# Patient Record
Sex: FEMALE | Race: WHITE | NOT HISPANIC OR LATINO | Employment: OTHER | URBAN - METROPOLITAN AREA
[De-identification: names, ages, dates, MRNs, and addresses within clinical notes are randomized per-mention and may not be internally consistent; named-entity substitution may affect disease eponyms.]

---

## 2023-07-16 ENCOUNTER — OFFICE VISIT (OUTPATIENT)
Dept: URGENT CARE | Facility: CLINIC | Age: 73
End: 2023-07-16
Payer: COMMERCIAL

## 2023-07-16 VITALS
DIASTOLIC BLOOD PRESSURE: 83 MMHG | TEMPERATURE: 97.1 F | SYSTOLIC BLOOD PRESSURE: 142 MMHG | RESPIRATION RATE: 18 BRPM | HEART RATE: 68 BPM | OXYGEN SATURATION: 98 %

## 2023-07-16 DIAGNOSIS — M17.12 PRIMARY OSTEOARTHRITIS OF LEFT KNEE: Primary | ICD-10-CM

## 2023-07-16 PROCEDURE — G0384 LEV 5 HOSP TYPE B ED VISIT: HCPCS | Performed by: PHYSICIAN ASSISTANT

## 2023-07-16 RX ORDER — OMEPRAZOLE 20 MG/1
CAPSULE, DELAYED RELEASE ORAL
COMMUNITY
Start: 2023-05-15

## 2023-07-16 RX ORDER — ROSUVASTATIN CALCIUM 5 MG/1
TABLET, COATED ORAL
COMMUNITY
Start: 2023-05-22

## 2023-07-16 NOTE — PROGRESS NOTES
[facility]        NAME: Alexandria Javier is a 71 y.o. female  : 1950    MRN: [id number]  DATE: 2023  TIME: 2:03 PM      Assessment and Plan     Primary osteoarthritis of left knee [M17.12]  1. Primary osteoarthritis of left knee  Ambulatory Referral to Orthopedic Surgery            Patient Instructions     Patient Instructions   Joint Pain    To help with pain   - Take ibuprofen or acetaminophen as directed on the packaging (it helps to start taking it around-the-clock at first to get ahead of the pain, then to take as needed)  - Ice and elevate the injured area 4 times daily for 20-30 minutes each time for the next 3-4 days, then convert to warm compresses in the same regimen   Other measures to take to help with healing    - Perform range of motion exercises 5-10 reps each at least 4 times per day so the joint does not get stiff    - Limit your physical activity with the affected joint. Doing too much too fast is the easiest way to continue to have pain  Follow up with Orthopedics as discussed       Follow up with PCP in 3-5 days. Go to ER if symptoms worsen. Chief Complaint     Chief Complaint   Patient presents with   • Knee Pain     Patient here with left knee pain for about a week or two now. Patient does not recall any injury to it. Patient states she has history of osteoarthritis which she usually gets injections for, but she has not had the injections within the last year. History of Present Illness     Patient presents with left knee pain. She states she has OA in that knee, but it has been getting worse over the last 1-2 weeks. She has been using CBD cream and motrin with relief. She was getting steroid shots in the knee about 1.5 years ago, but didn't feel like they were helping. Patient denies current injury. Knee Pain   Pertinent negatives include no numbness.        Review of Systems     Review of Systems   Constitutional: Negative for chills, fatigue and fever.   HENT: Negative for congestion, ear pain, postnasal drip, rhinorrhea, sinus pressure, sinus pain and sore throat. Eyes: Negative for pain and visual disturbance. Respiratory: Negative for cough, chest tightness and shortness of breath. Cardiovascular: Negative for chest pain and palpitations. Gastrointestinal: Negative for abdominal pain, diarrhea, nausea and vomiting. Genitourinary: Negative for dysuria and hematuria. Musculoskeletal: Positive for arthralgias. Negative for back pain and myalgias. Skin: Negative for rash. Neurological: Negative for dizziness, seizures, syncope, numbness and headaches. All other systems reviewed and are negative. Current Medications       Current Outpatient Medications:   •  omeprazole (PriLOSEC) 20 mg delayed release capsule, , Disp: , Rfl:   •  rosuvastatin (CRESTOR) 5 mg tablet, , Disp: , Rfl:     Current Allergies     Allergies as of 07/16/2023 - Reviewed 07/16/2023   Allergen Reaction Noted   • Codeine Rash 05/31/2022              The following portions of the patient's history were reviewed and updated as appropriate: allergies, current medications, past family history, past medical history, past social history, past surgical history, and problem list.     History reviewed. No pertinent past medical history. Past Surgical History:   Procedure Laterality Date   • KNEE SURGERY Left 2016       History reviewed. No pertinent family history. Medications have been verified. Objective     /83   Pulse 68   Temp (!) 97.1 °F (36.2 °C)   Resp 18   SpO2 98%   No LMP recorded. Physical Exam     Physical Exam  Vitals and nursing note reviewed. Constitutional:       Appearance: Normal appearance. She is normal weight. HENT:      Head: Normocephalic and atraumatic. Cardiovascular:      Rate and Rhythm: Normal rate and regular rhythm. Heart sounds: Normal heart sounds.    Pulmonary:      Effort: Pulmonary effort is normal.      Breath sounds: Normal breath sounds. Musculoskeletal:      Right knee: No swelling or erythema. Normal range of motion. No tenderness. No LCL laxity, MCL laxity, ACL laxity or PCL laxity. Normal meniscus. Skin:     General: Skin is warm and dry. Neurological:      General: No focal deficit present. Mental Status: She is alert and oriented to person, place, and time.    Psychiatric:         Mood and Affect: Mood normal.         Behavior: Behavior normal.

## 2023-07-16 NOTE — PATIENT INSTRUCTIONS
Joint Pain    To help with pain   - Take ibuprofen or acetaminophen as directed on the packaging (it helps to start taking it around-the-clock at first to get ahead of the pain, then to take as needed)  - Ice and elevate the injured area 4 times daily for 20-30 minutes each time for the next 3-4 days, then convert to warm compresses in the same regimen   Other measures to take to help with healing    - Perform range of motion exercises 5-10 reps each at least 4 times per day so the joint does not get stiff    - Limit your physical activity with the affected joint.  Doing too much too fast is the easiest way to continue to have pain  Follow up with Orthopedics as discussed

## 2023-07-31 ENCOUNTER — OFFICE VISIT (OUTPATIENT)
Dept: OBGYN CLINIC | Facility: CLINIC | Age: 73
End: 2023-07-31

## 2023-07-31 ENCOUNTER — APPOINTMENT (OUTPATIENT)
Dept: RADIOLOGY | Facility: CLINIC | Age: 73
End: 2023-07-31
Payer: COMMERCIAL

## 2023-07-31 VITALS
HEART RATE: 73 BPM | HEIGHT: 65 IN | BODY MASS INDEX: 22.36 KG/M2 | SYSTOLIC BLOOD PRESSURE: 112 MMHG | WEIGHT: 134.2 LBS | RESPIRATION RATE: 18 BRPM | DIASTOLIC BLOOD PRESSURE: 73 MMHG | OXYGEN SATURATION: 97 %

## 2023-07-31 DIAGNOSIS — M25.562 LEFT KNEE PAIN, UNSPECIFIED CHRONICITY: ICD-10-CM

## 2023-07-31 DIAGNOSIS — M25.561 RIGHT KNEE PAIN, UNSPECIFIED CHRONICITY: ICD-10-CM

## 2023-07-31 DIAGNOSIS — M17.0 PRIMARY OSTEOARTHRITIS OF BOTH KNEES: Primary | ICD-10-CM

## 2023-07-31 PROCEDURE — 73564 X-RAY EXAM KNEE 4 OR MORE: CPT

## 2023-07-31 RX ORDER — BUPIVACAINE HYDROCHLORIDE 2.5 MG/ML
4 INJECTION, SOLUTION INFILTRATION; PERINEURAL
Status: COMPLETED | OUTPATIENT
Start: 2023-07-31 | End: 2023-07-31

## 2023-07-31 RX ORDER — TRIAMCINOLONE ACETONIDE 40 MG/ML
40 INJECTION, SUSPENSION INTRA-ARTICULAR; INTRAMUSCULAR
Status: COMPLETED | OUTPATIENT
Start: 2023-07-31 | End: 2023-07-31

## 2023-07-31 RX ADMIN — TRIAMCINOLONE ACETONIDE 40 MG: 40 INJECTION, SUSPENSION INTRA-ARTICULAR; INTRAMUSCULAR at 11:00

## 2023-07-31 RX ADMIN — BUPIVACAINE HYDROCHLORIDE 4 ML: 2.5 INJECTION, SOLUTION INFILTRATION; PERINEURAL at 11:00

## 2023-07-31 NOTE — PROGRESS NOTES
Large joint arthrocentesis: bilateral knee  Universal Protocol:  Consent: Verbal consent obtained. Risks and benefits: risks, benefits and alternatives were discussed  Consent given by: patient    Supporting Documentation  Indications: pain   Procedure Details  Location: knee - bilateral knee  Needle size: 22 G  Ultrasound guidance: no  Approach: anterolateral    Medications (Right): 4 mL bupivacaine 0.25 %; 40 mg triamcinolone acetonide 40 mg/mLMedications (Left): 4 mL bupivacaine 0.25 %; 40 mg triamcinolone acetonide 40 mg/mL   Patient tolerance: patient tolerated the procedure well with no immediate complications    Risks and benefits of CSI were discussed with patient extensively. Risks were highlighted which included but were not limited to infection, pain, local site swelling, and chance that injection may not be effective. Patient was also counseled regarding glucose elevation days after receiving CSI and to be mindful of diet and check sugars daily. Patient agreeable to proceed with CSI after counseling.

## 2023-07-31 NOTE — PROGRESS NOTES
Accompanied by     Subjective:    Chief Complaint   Patient presents with   • Left Knee - Swelling, Pain   • Right Knee - Pain       Ying Jacobs is a 71 y.o. female complains of bilateral knee pain. Onset of the symptoms was several months ago. Mechanism of injury: none. Aggravating factors: going up and down stairs, walking  and weight bearing. Treatment to date: rest and HA injections, CSI which were effective in past. Symptoms have gradually worsened. The following portions of the patient's history were reviewed and updated as appropriate: allergies, current medications, past family history, past medical history, past social history, past surgical history and problem list.    Occupation:      Review of Systems   Constitutional: Negative for fever. HENT: Negative for dental problem and headaches. Eyes: Negative for vision loss. Respiratory: Negative for cough and shortness of breath. Cardiovascular: Negative for leg swelling and palpitations. Gastrointestinal: Negative for constipation and diarrhea. Genitourinary: Negative for bladder incontinence and difficulty urinating. Musculoskeletal: Negative for back pain and difficulty walking. Skin: Negative for rash and ulcer. Neurological: Negative for dizziness and headaches. Hem/Lymph/Immuno: Negative for blood clots. Does not bruise/bleed easily. Psychiatric/Behavioral: Negative for confusion. Objective:  /73   Pulse 73   Resp 18   Ht 5' 4.5" (1.638 m)   Wt 60.9 kg (134 lb 3.2 oz)   SpO2 97%   BMI 22.68 kg/m²   Skin: no rashes, lesions, skin discolorations, lacerations  Vasculature: normal popliteal and pedal pulse, normal skin color, normal capillary refill in extremity, no lower extremity edema  Neurologic: Neurologic exam is normal throughout lower extremities, Awake, alert, and oriented x3, no apparent distress.     Musculoskeletal: bilateral KNEE EXAM  Gait: limping gait negative  Inspection:No erythema, no induration. There is no gross deformity. Palpation: Swelling: positive. Effusion: negative. Medial joint line TTP: +  Lateral joint line TTP: negative  ROM: Full flexion and extension  Special tests: [facility]'s: negative  Instability to varus/valgus stress: negative  Anterior Drawer: negative Lachman's test: negative  Posterior Drawer: negative          Imaging:       Assessment/Plan:  1. Left knee pain, unspecified chronicity    - XR knee 4+ vw left injury; Future    2. Primary osteoarthritis of both knees    - Ambulatory Referral to Orthopedic Surgery  - Large joint arthrocentesis: bilateral knee    3. Right knee pain, unspecified chronicity    - XR knee 4+ vw right injury; Future      > 45 min devoted to review of previous, pertinent medical records, imaging, discussion of treatment options, counseling and documentation  Imaging independently reviewed and discussed with patient. Degenerative changes noted, no acute fractures appreciated. Follow-up official reading. We discussed the nature of knee OA at length and detailed the treatment approach. Directed to ice the area daily for 20 minutes at a time using a barrier to protect the skin- stressed specifically icing after activity to address inflammation  Bilateral CSI peformed for knee in office today without complication  Follow up in as needed . Should sx's worsen or any concerns arise, they were advised to follow up sooner or seek more immediate medical attention. All of the patient's concerns were addressed and questions answered. They verbalized agreement with and understanding of the treatment plan.

## 2023-09-19 ENCOUNTER — OFFICE VISIT (OUTPATIENT)
Dept: OBGYN CLINIC | Facility: CLINIC | Age: 73
End: 2023-09-19
Payer: COMMERCIAL

## 2023-09-19 VITALS
HEIGHT: 65 IN | BODY MASS INDEX: 22.33 KG/M2 | HEART RATE: 75 BPM | WEIGHT: 134 LBS | DIASTOLIC BLOOD PRESSURE: 68 MMHG | SYSTOLIC BLOOD PRESSURE: 138 MMHG | OXYGEN SATURATION: 99 %

## 2023-09-19 DIAGNOSIS — M17.0 PRIMARY OSTEOARTHRITIS OF BOTH KNEES: Primary | ICD-10-CM

## 2023-09-19 PROCEDURE — 99213 OFFICE O/P EST LOW 20 MIN: CPT | Performed by: FAMILY MEDICINE

## 2023-09-26 NOTE — PROGRESS NOTES
Accompanied by     Subjective:    Chief Complaint   Patient presents with   • Left Knee - Pain, Follow-up   • Right Knee - Pain, Follow-up       Dereje Dykes is a 68 y.o. female complains of bilateral knee pain. Onset of the symptoms was several months ago. Mechanism of injury: none. Aggravating factors: none. Treatment to date: rest and HA injections, CSI which were effective in past. Symptoms have essentially resolved. The following portions of the patient's history were reviewed and updated as appropriate: allergies, current medications, past family history, past medical history, past social history, past surgical history and problem list.    Occupation:      Review of Systems   Constitutional: Negative for fever. HENT: Negative for dental problem and headaches. Eyes: Negative for vision loss. Respiratory: Negative for cough and shortness of breath. Cardiovascular: Negative for leg swelling and palpitations. Gastrointestinal: Negative for constipation and diarrhea. Genitourinary: Negative for bladder incontinence and difficulty urinating. Musculoskeletal: Negative for back pain and difficulty walking. Skin: Negative for rash and ulcer. Neurological: Negative for dizziness and headaches. Hem/Lymph/Immuno: Negative for blood clots. Does not bruise/bleed easily. Psychiatric/Behavioral: Negative for confusion. Objective:  /68   Pulse 75   Ht 5' 4.5" (1.638 m)   Wt 60.8 kg (134 lb)   SpO2 99%   BMI 22.65 kg/m²   Skin: no rashes, lesions, skin discolorations, lacerations  Vasculature: normal popliteal and pedal pulse, normal skin color, normal capillary refill in extremity, no lower extremity edema  Neurologic: Neurologic exam is normal throughout lower extremities, Awake, alert, and oriented x3, no apparent distress. Musculoskeletal: bilateral KNEE EXAM  Gait: limping gait negative  Inspection:No erythema, no induration. There is no gross deformity. Palpation: Swelling: positive. Effusion: negative. Medial joint line TTP: +  Lateral joint line TTP: negative  ROM: Full flexion and extension  Special tests: [facility]'s: negative  Instability to varus/valgus stress: negative  Anterior Drawer: negative Lachman's test: negative  Posterior Drawer: negative          Imaging:       Assessment/Plan:    1. Primary osteoarthritis of both knees  Patient is improved in terms of subjective pain symptoms following cortisone injection provided 2 months ago. We discussed consideration for Visco injections however given her response we will continue to monitor.   Follow-up with me as needed

## 2024-08-19 ENCOUNTER — OFFICE VISIT (OUTPATIENT)
Dept: URGENT CARE | Facility: CLINIC | Age: 74
End: 2024-08-19
Payer: COMMERCIAL

## 2024-08-19 VITALS
OXYGEN SATURATION: 100 % | TEMPERATURE: 96.8 F | HEART RATE: 52 BPM | SYSTOLIC BLOOD PRESSURE: 118 MMHG | RESPIRATION RATE: 18 BRPM | DIASTOLIC BLOOD PRESSURE: 68 MMHG

## 2024-08-19 DIAGNOSIS — J01.00 ACUTE NON-RECURRENT MAXILLARY SINUSITIS: Primary | ICD-10-CM

## 2024-08-19 PROCEDURE — 99213 OFFICE O/P EST LOW 20 MIN: CPT | Performed by: PHYSICIAN ASSISTANT

## 2024-08-19 PROCEDURE — S9083 URGENT CARE CENTER GLOBAL: HCPCS | Performed by: PHYSICIAN ASSISTANT

## 2024-08-19 RX ORDER — METHYLPREDNISOLONE 4 MG
TABLET, DOSE PACK ORAL
Qty: 21 TABLET | Refills: 0 | Status: SHIPPED | OUTPATIENT
Start: 2024-08-19

## 2024-08-19 NOTE — PROGRESS NOTES
[facility]        NAME: Flori De La Torre is a 73 y.o. female  : 1950    MRN: [id number]  DATE: 2024  TIME: 2:49 PM      Assessment and Plan     Acute non-recurrent maxillary sinusitis [J01.00]  1. Acute non-recurrent maxillary sinusitis  methylPREDNISolone 4 MG tablet therapy pack          Note:   Likely viral - Rx steroid for inflammation    Patient Instructions   There are no Patient Instructions on file for this visit.     Follow up with primary care provider.   Go to ER if symptoms worsen.    Chief Complaint     Chief Complaint   Patient presents with    Sinus Problem     Pt states she is having sinus pressure, sneezing and a headache for 3-4 days. OTC meds: benadryl -no relief         History of Present Illness     Patient presents with sinus pressure, nasal congestion, sneezing, and headache x 3-4 days. She has taken OTC benadryl without relief.         Review of Systems     Review of Systems   Constitutional:  Negative for chills, fatigue and fever.   HENT:  Positive for congestion, sinus pressure and sneezing. Negative for ear pain, postnasal drip, rhinorrhea, sinus pain and sore throat.    Eyes:  Negative for pain and visual disturbance.   Respiratory:  Negative for cough, chest tightness and shortness of breath.    Cardiovascular:  Negative for chest pain and palpitations.   Gastrointestinal:  Negative for abdominal pain, diarrhea, nausea and vomiting.   Genitourinary:  Negative for dysuria and hematuria.   Musculoskeletal:  Negative for arthralgias, back pain and myalgias.   Skin:  Negative for rash.   Neurological:  Positive for headaches. Negative for dizziness, seizures, syncope and numbness.   All other systems reviewed and are negative.        Current Medications       Current Outpatient Medications:     methylPREDNISolone 4 MG tablet therapy pack, Use as directed on package, Disp: 21 tablet, Rfl: 0    omeprazole (PriLOSEC) 20 mg delayed release capsule, , Disp: , Rfl:      rosuvastatin (CRESTOR) 5 mg tablet, , Disp: , Rfl:     Current Allergies     Allergies as of 08/19/2024 - Reviewed 08/19/2024   Allergen Reaction Noted    Codeine Rash 05/31/2022              The following portions of the patient's history were reviewed and updated as appropriate: allergies, current medications, past family history, past medical history, past social history, past surgical history, and problem list.     Past Medical History:   Diagnosis Date    Stomach disorder        Past Surgical History:   Procedure Laterality Date    KNEE SURGERY Left 2016       Family History   Problem Relation Age of Onset    Heart disease Father          Medications have been verified.        Objective     /68   Pulse (!) 52   Temp (!) 96.8 °F (36 °C)   Resp 18   SpO2 100%   No LMP recorded. Patient is postmenopausal.         Physical Exam     Physical Exam  Vitals and nursing note reviewed. Exam conducted with a chaperone present ().   Constitutional:       Appearance: Normal appearance. She is normal weight.   HENT:      Head: Normocephalic and atraumatic.      Right Ear: Tympanic membrane, ear canal and external ear normal.      Left Ear: Tympanic membrane, ear canal and external ear normal.      Nose: Nose normal.      Mouth/Throat:      Mouth: Mucous membranes are moist.      Pharynx: Oropharynx is clear.   Cardiovascular:      Rate and Rhythm: Normal rate and regular rhythm.      Heart sounds: Normal heart sounds.   Pulmonary:      Effort: Pulmonary effort is normal.      Breath sounds: Normal breath sounds.   Skin:     General: Skin is warm and dry.   Neurological:      General: No focal deficit present.      Mental Status: She is alert and oriented to person, place, and time.   Psychiatric:         Mood and Affect: Mood normal.         Behavior: Behavior normal.